# Patient Record
Sex: FEMALE | Race: WHITE
[De-identification: names, ages, dates, MRNs, and addresses within clinical notes are randomized per-mention and may not be internally consistent; named-entity substitution may affect disease eponyms.]

---

## 2018-04-19 ENCOUNTER — HOSPITAL ENCOUNTER (OUTPATIENT)
Dept: HOSPITAL 80 - FSGY | Age: 58
Discharge: HOME | End: 2018-04-19
Attending: SPECIALIST
Payer: COMMERCIAL

## 2018-04-19 VITALS — DIASTOLIC BLOOD PRESSURE: 69 MMHG | SYSTOLIC BLOOD PRESSURE: 120 MMHG

## 2018-04-19 DIAGNOSIS — M65.331: Primary | ICD-10-CM

## 2018-04-19 PROCEDURE — 0LN70ZZ RELEASE RIGHT HAND TENDON, OPEN APPROACH: ICD-10-PCS | Performed by: SPECIALIST

## 2018-04-19 NOTE — GOP
[f 
rep st]



                                                                OPERATIVE REPORT





DATE OF OPERATION:  04/19/2018



SURGEON:  Waldo Mayfield MD



PREOPERATIVE DIAGNOSIS:  Right long trigger finger.



POSTOPERATIVE DIAGNOSIS:  Long trigger finger with very significant 
inflammatory and scarred tenosynovium on the sublimis tendon.



PROPOSED OPERATION/PROCEDURE PERFORMED:  Trigger finger release right long 
finger, with tenosynovectomy.



FINDINGS:  



INDICATIONS:  This patient had such severe trigger finger that there was not 
only a large indentation in the sublimis tendon at the level of the A1 pulley 
but also significant scarred, inflammatory tenosynovium extending well above 
the A1 pulley.  She had a lot of tenderness, locking, and pain.  It was felt at 
this point that surgical treatment was a good option for her.



DESCRIPTION OF PROCEDURE:  Under local infiltration with 0.5% plain Marcaine, 
the operative area achieved adequate anesthesia.  A total of 7 cc of Marcaine 
was used at the operative area.  The right hand was prepped and draped in the 
usual fashion, and the forearm tourniquet applied at 250 mmHg.  



An oblique incision was made over the palmar aspect of the right long finger at 
the metacarpal head area.  Skin and subcutaneous tissue were reflected, and the 
A1 pulley was exposed.  There was marked inflammatory tissue extending above 
the A1 pulley.  A1 pulley was released.  The proximal extension of the A1 
pulley was released.  A portion of the A1 pulley was excised, and inflammatory 
and scarred tenosynovium was removed up to about the mid-palm area and down to 
the A2 pulley area.  There was a very significant indentation in the sublimis 
tendon at the distal end of the A1 pulley.  Once the tenosynovectomy had been 
completed, the tendons glided nicely, and full range of motion of the right 
long finger was restored.  The tendons glided smoothly without stuttering.  The 
wound was irrigated with body-temperature saline, and then skin closed with 
horizontal mattress sutures of 4-0 PDS.  A bulky, soft, pressure dressing was 
applied and held in place with Coban tape.  Tourniquet deflation resulted in 
immediate pinking of the digits.  



She was brought to the recovery area where detailed postoperative instructions 
were given prior to discharge.



POSTOPERATIVE INSTRUCTIONS:  A prescription for tramadol and Keflex was 
provided.  Followup arrangements in the office for about a week postop for 
dressing and suture removal and remobilization exercises.  She can use her hand 
for gentle activity during the week postop, 2-pound limit.





Job #:  [id number]

MTDD

## 2018-04-19 NOTE — POSTOPPROG
Post Op Note


Date of Operation: 04/19/18


Surgeon: Waldo Mayfield


Assistant: none


Anesthesiologist: local only


Anesthesia: Local (Specify) (local infiltration)


Pre-op Diagnosis: right long trigger finger


Post-op Diagnosis: same


Indication: pain


Procedure: right long trigger finger release


Findings: tight A1 pulley


Inf/Abcess present in the surg proc area at time of surgery?: No


Depth: Deep Incisional (Fascial)


EBL: Minimal


Total fluids administered: none